# Patient Record
Sex: FEMALE | Race: OTHER | Employment: FULL TIME | URBAN - METROPOLITAN AREA
[De-identification: names, ages, dates, MRNs, and addresses within clinical notes are randomized per-mention and may not be internally consistent; named-entity substitution may affect disease eponyms.]

---

## 2018-01-08 PROBLEM — Z86.39 HISTORY OF HYPERTHYROIDISM: Status: ACTIVE | Noted: 2018-01-08

## 2018-09-13 ENCOUNTER — HOSPITAL ENCOUNTER (OUTPATIENT)
Dept: MAMMOGRAPHY | Age: 53
Discharge: HOME | End: 2018-09-13
Attending: FAMILY MEDICINE
Payer: COMMERCIAL

## 2018-09-13 DIAGNOSIS — Z12.39 BREAST CANCER SCREENING: ICD-10-CM

## 2018-09-13 DIAGNOSIS — Z13.820 OSTEOPOROSIS SCREENING: ICD-10-CM

## 2018-09-13 PROCEDURE — 77066 DX MAMMO INCL CAD BI: CPT

## 2018-09-13 PROCEDURE — 76642 ULTRASOUND BREAST LIMITED: CPT

## 2018-09-13 PROCEDURE — 77080 DXA BONE DENSITY AXIAL: CPT

## 2018-09-14 NOTE — PROGRESS NOTES
Please ask patient to schedule a follow up visit to discuss ABNORMAL Dexa scan results : Osteoporosis.     Thanks,  Dr. Aury Breaux

## 2018-10-23 PROBLEM — R87.610 ASCUS WITH POSITIVE HIGH RISK HPV CERVICAL: Status: ACTIVE | Noted: 2018-10-23

## 2018-10-23 PROBLEM — R87.810 ASCUS WITH POSITIVE HIGH RISK HPV CERVICAL: Status: ACTIVE | Noted: 2018-10-23

## 2018-11-07 ENCOUNTER — HOSPITAL ENCOUNTER (OUTPATIENT)
Dept: LAB | Age: 53
Discharge: HOME | End: 2018-11-07

## 2018-11-07 PROCEDURE — 88305 TISSUE EXAM BY PATHOLOGIST: CPT

## 2022-03-19 PROBLEM — Z86.39 HISTORY OF HYPERTHYROIDISM: Status: ACTIVE | Noted: 2018-01-08

## 2022-03-20 PROBLEM — R87.610 ASCUS WITH POSITIVE HIGH RISK HPV CERVICAL: Status: ACTIVE | Noted: 2018-10-23

## 2022-03-20 PROBLEM — R87.810 ASCUS WITH POSITIVE HIGH RISK HPV CERVICAL: Status: ACTIVE | Noted: 2018-10-23

## 2022-10-12 ENCOUNTER — HOSPITAL ENCOUNTER (OUTPATIENT)
Dept: MAMMOGRAPHY | Age: 57
Discharge: HOME | End: 2022-10-15
Payer: COMMERCIAL

## 2022-10-12 ENCOUNTER — APPOINTMENT (OUTPATIENT)
Dept: MAMMOGRAPHY | Age: 57
End: 2022-10-12
Payer: COMMERCIAL

## 2022-10-12 DIAGNOSIS — N64.4 BREAST PAIN, LEFT: ICD-10-CM

## 2022-10-12 PROCEDURE — 77066 DX MAMMO INCL CAD BI: CPT

## 2022-10-12 PROCEDURE — 76642 ULTRASOUND BREAST LIMITED: CPT

## 2023-03-21 ENCOUNTER — TRANSCRIBE ORDERS (OUTPATIENT)
Dept: SCHEDULING | Age: 58
End: 2023-03-21

## 2023-03-21 DIAGNOSIS — Z12.31 SCREENING MAMMOGRAM FOR HIGH-RISK PATIENT: Primary | ICD-10-CM

## 2023-04-24 ENCOUNTER — HOSPITAL ENCOUNTER (OUTPATIENT)
Dept: MAMMOGRAPHY | Age: 58
Discharge: HOME | End: 2023-04-27
Payer: COMMERCIAL

## 2023-04-24 DIAGNOSIS — R92.8 ABNORMAL ULTRASOUND OF BREAST: ICD-10-CM

## 2023-04-24 DIAGNOSIS — N64.4 BREAST PAIN, LEFT: ICD-10-CM

## 2023-04-24 PROCEDURE — 77065 DX MAMMO INCL CAD UNI: CPT

## 2024-06-26 ENCOUNTER — HOSPITAL ENCOUNTER (OUTPATIENT)
Dept: MAMMOGRAPHY | Age: 59
Discharge: HOME | End: 2024-06-29

## 2024-06-26 VITALS — BODY MASS INDEX: 23.04 KG/M2 | WEIGHT: 130 LBS | HEIGHT: 63 IN

## 2024-06-26 DIAGNOSIS — N60.02 BREAST CYST, LEFT: ICD-10-CM

## 2024-06-26 PROCEDURE — G0279 TOMOSYNTHESIS, MAMMO: HCPCS
